# Patient Record
Sex: MALE | Race: WHITE | Employment: FULL TIME | URBAN - METROPOLITAN AREA
[De-identification: names, ages, dates, MRNs, and addresses within clinical notes are randomized per-mention and may not be internally consistent; named-entity substitution may affect disease eponyms.]

---

## 2017-11-30 ENCOUNTER — HOSPITAL ENCOUNTER (EMERGENCY)
Age: 22
Discharge: HOME | End: 2017-11-30
Payer: COMMERCIAL

## 2017-11-30 VITALS
WEIGHT: 210 LBS | DIASTOLIC BLOOD PRESSURE: 88 MMHG | OXYGEN SATURATION: 100 % | HEART RATE: 125 BPM | BODY MASS INDEX: 30 KG/M2 | RESPIRATION RATE: 16 BRPM | SYSTOLIC BLOOD PRESSURE: 146 MMHG

## 2017-11-30 DIAGNOSIS — H61.23 BILATERAL IMPACTED CERUMEN: Primary | ICD-10-CM

## 2017-11-30 PROCEDURE — 99283 EMERGENCY DEPT VISIT LOW MDM: CPT

## 2017-11-30 PROCEDURE — 69209 REMOVE IMPACTED EAR WAX UNI: CPT

## 2017-11-30 NOTE — ED PROVIDER NOTES
Patient Seen in: [facility] Emergency Department In [place]    History   Patient presents with:  Ear Problem Pain (neurosensory)    Stated Complaint: left ear pain onset two weeks    79-year-old male presents today with complaints of ear wax buildup to th Ear: No tenderness. Left Ear: No tenderness. Bilateral cerumen impaction. Pulmonary/Chest: Effort normal.   Neurological: He is alert and oriented to person, place, and time. Skin: Skin is warm and dry. Nursing note and vitals reviewed.

## 2017-11-30 NOTE — ED INITIAL ASSESSMENT (HPI)
Pt here for lt ear congestion states he had ear irrigated at immediate care on Monday and has been using otc ear drops but states he continues to have ear fullness.

## 2018-05-10 ENCOUNTER — OFFICE VISIT (OUTPATIENT)
Dept: OCCUPATIONAL MEDICINE | Age: 23
End: 2018-05-10
Attending: PHYSICIAN ASSISTANT
Payer: OTHER MISCELLANEOUS

## 2018-05-10 DIAGNOSIS — S61.012A LACERATION OF LEFT THUMB: Primary | ICD-10-CM

## 2018-05-12 ENCOUNTER — OFFICE VISIT (OUTPATIENT)
Dept: OCCUPATIONAL MEDICINE | Age: 23
End: 2018-05-12
Attending: PHYSICIAN ASSISTANT
Payer: COMMERCIAL

## 2018-05-12 DIAGNOSIS — S61.012D LACERATION OF LEFT THUMB WITHOUT COMPLICATION, SUBSEQUENT ENCOUNTER: Primary | ICD-10-CM

## 2019-07-30 ENCOUNTER — OFFICE VISIT (OUTPATIENT)
Dept: OCCUPATIONAL MEDICINE | Age: 24
End: 2019-07-30
Attending: PREVENTIVE MEDICINE
Payer: OTHER MISCELLANEOUS

## 2019-07-31 ENCOUNTER — OFFICE VISIT (OUTPATIENT)
Dept: OCCUPATIONAL MEDICINE | Age: 24
End: 2019-07-31
Attending: PHYSICIAN ASSISTANT
Payer: OTHER MISCELLANEOUS

## 2019-07-31 DIAGNOSIS — T23.421D: Primary | ICD-10-CM

## (undated) NOTE — ED AVS SNAPSHOT
Karey Sanchez   MRN: [id number]    Department:  [facility] Emergency Department in [place]   Date of Visit:  11/30/2017           Disclosure     Insurance plans vary and the physician(s) referred by the ER may not be covered by your plan.  Please contact tell this physician (or your personal doctor if your instructions are to return to your personal doctor) about any new or lasting problems. The primary care or specialist physician will see patients referred from the [facility] Emergency Department.  Severo Pastures